# Patient Record
Sex: MALE | Race: WHITE | URBAN - METROPOLITAN AREA
[De-identification: names, ages, dates, MRNs, and addresses within clinical notes are randomized per-mention and may not be internally consistent; named-entity substitution may affect disease eponyms.]

---

## 2024-09-05 ENCOUNTER — OFFICE VISIT (OUTPATIENT)
Age: 39
End: 2024-09-05

## 2024-09-05 VITALS
TEMPERATURE: 97.2 F | RESPIRATION RATE: 18 BRPM | HEART RATE: 76 BPM | DIASTOLIC BLOOD PRESSURE: 86 MMHG | OXYGEN SATURATION: 95 % | SYSTOLIC BLOOD PRESSURE: 142 MMHG

## 2024-09-05 DIAGNOSIS — J30.2 SEASONAL ALLERGIES: Primary | ICD-10-CM

## 2024-09-05 RX ORDER — AMLODIPINE BESYLATE 5 MG/1
5 TABLET ORAL DAILY
COMMUNITY

## 2024-09-05 RX ORDER — ATENOLOL 25 MG/1
25 TABLET ORAL DAILY
COMMUNITY

## 2024-09-05 RX ORDER — ASPIRIN 81 MG/1
81 TABLET, CHEWABLE ORAL PRN
COMMUNITY

## 2024-09-05 RX ORDER — AZELASTINE 1 MG/ML
1 SPRAY, METERED NASAL 2 TIMES DAILY
Qty: 30 ML | Refills: 0 | Status: SHIPPED | OUTPATIENT
Start: 2024-09-05

## 2024-09-05 NOTE — PROGRESS NOTES
Jose Osborne (:  1985) is a 38 y.o. male,New patient, here for evaluation of the following chief complaint(s):  Congestion      Assessment & Plan :  Visit Diagnoses and Associated Orders       Seasonal allergies    -  Primary    azelastine (ASTELIN) 0.1 % nasal spray [69926]           ORDERS WITHOUT AN ASSOCIATED DIAGNOSIS    atenolol (TENORMIN) 25 MG tablet [717]      amLODIPine (NORVASC) 5 MG tablet [9071]      aspirin 81 MG chewable tablet [680]            Clinical exam consistent with seasonal allergies  Drink fluids  Rest  Azelastine nasal spray    Patient verbalized understanding of printed and verbal discharge instructions including follow up care.      Follow up in 7 days if symptoms persist or if symptoms worsen.       Subjective :  HPI  HPI:   38 y.o. male presents with symptoms of Sinus Pain  Patient complains of congestion. Onset of symptoms was 7 days ago. Symptoms have been unchanged since that time. He is drinking plenty of fluids.  Past history is significant for nothing. Patient is smoker  (1 ppd x 17 yrs).         Vitals:    24 1037 24 1059   BP: (!) 144/86 (!) 142/86   Site: Left Upper Arm Right Upper Arm   Position: Sitting Sitting   Cuff Size: Large Adult Large Adult   Pulse: 76    Resp: 18    Temp: 97.2 °F (36.2 °C)    TempSrc: Oral    SpO2: 95%           Objective   Physical Exam  Vitals and nursing note reviewed.   Constitutional:       Appearance: Normal appearance.   HENT:      Right Ear: Tympanic membrane, ear canal and external ear normal.      Left Ear: Tympanic membrane, ear canal and external ear normal.      Nose:      Right Sinus: No maxillary sinus tenderness or frontal sinus tenderness.      Left Sinus: No maxillary sinus tenderness or frontal sinus tenderness.      Mouth/Throat:      Mouth: Mucous membranes are moist.      Pharynx: Oropharynx is clear. Posterior oropharyngeal erythema and postnasal drip present. No pharyngeal swelling, oropharyngeal exudate

## 2024-11-19 ENCOUNTER — OFFICE VISIT (OUTPATIENT)
Age: 39
End: 2024-11-19

## 2024-11-19 VITALS
OXYGEN SATURATION: 99 % | HEART RATE: 68 BPM | SYSTOLIC BLOOD PRESSURE: 132 MMHG | WEIGHT: 274.7 LBS | DIASTOLIC BLOOD PRESSURE: 88 MMHG | TEMPERATURE: 97.9 F

## 2024-11-19 DIAGNOSIS — R05.9 COUGH, UNSPECIFIED TYPE: Primary | ICD-10-CM

## 2024-11-19 LAB
INFLUENZA A ANTIBODY: NEGATIVE
INFLUENZA B ANTIBODY: NEGATIVE

## 2024-11-19 ASSESSMENT — ENCOUNTER SYMPTOMS
COUGH: 1
SINUS PRESSURE: 1

## 2024-11-19 NOTE — PROGRESS NOTES
Jose Osborne (:  1985) is a 39 y.o. male,Established patient, here for evaluation of the following chief complaint(s):  Cough, Sinusitis, and Nausea      ASSESSMENT/PLAN:  1. Cough, unspecified type  - POCT Influenza A/B NEGATIVE       Return if symptoms worsen or fail to improve.    SUBJECTIVE/OBJECTIVE:  PRESENRT TO CLINIC WITH COUGH, CONGESTION AND SINUS PRESSURE      History provided by:  Patient      Vitals:    24 1809 24 1848   BP: (!) 142/87 132/88   Site: Right Upper Arm Right Upper Arm   Position: Sitting Sitting   Cuff Size: Large Adult Large Adult   Pulse: 68    Temp: 97.9 °F (36.6 °C)    TempSrc: Oral    SpO2: 99%    Weight: 124.6 kg (274 lb 11.2 oz)        Review of Systems   HENT:  Positive for sinus pressure.    Respiratory:  Positive for cough.        Physical Exam  Constitutional:       Appearance: Normal appearance.   HENT:      Head: Normocephalic and atraumatic.      Nose: Nose normal.      Mouth/Throat:      Mouth: Mucous membranes are moist.   Eyes:      Pupils: Pupils are equal, round, and reactive to light.   Pulmonary:      Effort: Pulmonary effort is normal.      Breath sounds: Normal breath sounds.   Musculoskeletal:         General: Normal range of motion.      Cervical back: Normal range of motion and neck supple.   Skin:     General: Skin is warm.   Neurological:      General: No focal deficit present.      Mental Status: He is alert.   Psychiatric:         Mood and Affect: Mood normal.         Behavior: Behavior normal.           An electronic signature was used to authenticate this note.    --Esteban Poole DO

## 2025-02-18 ENCOUNTER — OFFICE VISIT (OUTPATIENT)
Age: 40
End: 2025-02-18

## 2025-02-18 VITALS
SYSTOLIC BLOOD PRESSURE: 143 MMHG | OXYGEN SATURATION: 96 % | TEMPERATURE: 97.9 F | WEIGHT: 274 LBS | DIASTOLIC BLOOD PRESSURE: 99 MMHG | HEART RATE: 86 BPM

## 2025-02-18 DIAGNOSIS — R05.9 COUGH WITH FEVER: ICD-10-CM

## 2025-02-18 DIAGNOSIS — J40 BRONCHITIS: Primary | ICD-10-CM

## 2025-02-18 DIAGNOSIS — H66.001 ACUTE SUPPURATIVE OTITIS MEDIA OF RIGHT EAR WITHOUT SPONTANEOUS RUPTURE OF TYMPANIC MEMBRANE, RECURRENCE NOT SPECIFIED: ICD-10-CM

## 2025-02-18 DIAGNOSIS — I10 PRIMARY HYPERTENSION: ICD-10-CM

## 2025-02-18 DIAGNOSIS — R50.9 COUGH WITH FEVER: ICD-10-CM

## 2025-02-18 LAB
INFLUENZA VIRUS A RNA: NORMAL
INFLUENZA VIRUS B RNA: NORMAL

## 2025-02-18 RX ORDER — CEFDINIR 300 MG/1
300 CAPSULE ORAL 2 TIMES DAILY
Qty: 20 CAPSULE | Refills: 0 | Status: SHIPPED | OUTPATIENT
Start: 2025-02-18 | End: 2025-02-28

## 2025-02-18 RX ORDER — IPRATROPIUM BROMIDE AND ALBUTEROL SULFATE 2.5; .5 MG/3ML; MG/3ML
1 SOLUTION RESPIRATORY (INHALATION) ONCE
Status: COMPLETED | OUTPATIENT
Start: 2025-02-18 | End: 2025-02-18

## 2025-02-18 RX ORDER — ALBUTEROL SULFATE 90 UG/1
2 INHALANT RESPIRATORY (INHALATION) EVERY 4 HOURS PRN
Qty: 18 G | Refills: 0 | Status: SHIPPED | OUTPATIENT
Start: 2025-02-18

## 2025-02-18 RX ORDER — PREDNISONE 20 MG/1
40 TABLET ORAL DAILY
Qty: 10 TABLET | Refills: 0 | Status: SHIPPED | OUTPATIENT
Start: 2025-02-18 | End: 2025-02-23

## 2025-02-18 RX ORDER — DEXTROMETHORPHAN HYDROBROMIDE AND PROMETHAZINE HYDROCHLORIDE 15; 6.25 MG/5ML; MG/5ML
5 SYRUP ORAL 4 TIMES DAILY PRN
Qty: 200 ML | Refills: 0 | Status: SHIPPED | OUTPATIENT
Start: 2025-02-18

## 2025-02-18 RX ADMIN — IPRATROPIUM BROMIDE AND ALBUTEROL SULFATE 1 DOSE: 2.5; .5 SOLUTION RESPIRATORY (INHALATION) at 17:59

## 2025-02-18 ASSESSMENT — ENCOUNTER SYMPTOMS
COUGH: 1
SINUS PRESSURE: 0
SORE THROAT: 0
CHEST TIGHTNESS: 0
SINUS PAIN: 0
SHORTNESS OF BREATH: 1

## 2025-02-18 NOTE — PROGRESS NOTES
Jose Osborne (:  1985) is a 39 y.o. male,Established patient, here for evaluation of the following chief complaint(s):  Cold Symptoms (Pt states cold symptoms with congestion, cough and chills for past week. ) and Congestion      ASSESSMENT/PLAN:    ICD-10-CM    1. Bronchitis  J40 ipratropium 0.5 mg-albuterol 2.5 mg (DUONEB) nebulizer solution 1 Dose     predniSONE (DELTASONE) 20 MG tablet     albuterol sulfate HFA (VENTOLIN HFA) 108 (90 Base) MCG/ACT inhaler     promethazine-dextromethorphan (PROMETHAZINE-DM) 6.25-15 MG/5ML syrup      2. Cough with fever  R05.9 POCT Influenza A/B DNA (Alere i)    R50.9       3. Primary hypertension  I10       4. Acute suppurative otitis media of right ear without spontaneous rupture of tympanic membrane, recurrence not specified  H66.001 cefdinir (OMNICEF) 300 MG capsule        Results for orders placed or performed in visit on 25   POCT Influenza A/B DNA (Alere i)   Result Value Ref Range    Influenza virus A RNA neg     Influenza virus B RNA neg     Clinical exam consistent with bronchitis  Duo neb given in clinic  Oxygen saturation improved to 96%. Lungs auscultated after duo neb with scattered wheezing, increased air movement.  Prednisone can have side effects. Verbalized understanding of side effects  Prednisone  Promethazine DM for cough  Albuterol inhaler for wheezing/SOB  Try to reduce or stop smoking  Otitis media  Cefdinir take as directed. Finish antibiotic  Acetaminophen/ibuprofen for fever/pain  Primary hypertension  BP improved with Recheck  Reminded to take BP medication daily  Did not take anti hypertensive today  .Hypertension can cause chronic kidney disease, heart disease and stroke. Discussed complications of elevated blood pressure/hypertension today.     If you develop increased SOB or difficulty breathing you should call 911 or go to the ED  Follow up in 7 days if symptoms persist or if symptoms worsen.    SUBJECTIVE/OBJECTIVE:  Smokes 1 ppd